# Patient Record
Sex: MALE | Race: WHITE | NOT HISPANIC OR LATINO | Employment: FULL TIME | URBAN - NONMETROPOLITAN AREA
[De-identification: names, ages, dates, MRNs, and addresses within clinical notes are randomized per-mention and may not be internally consistent; named-entity substitution may affect disease eponyms.]

---

## 2024-08-14 ENCOUNTER — OFFICE VISIT (OUTPATIENT)
Dept: URGENT CARE | Facility: CLINIC | Age: 24
End: 2024-08-14

## 2024-08-14 VITALS
DIASTOLIC BLOOD PRESSURE: 74 MMHG | HEART RATE: 74 BPM | OXYGEN SATURATION: 98 % | TEMPERATURE: 98.2 F | SYSTOLIC BLOOD PRESSURE: 110 MMHG | RESPIRATION RATE: 18 BRPM

## 2024-08-14 DIAGNOSIS — J06.9 VIRAL UPPER RESPIRATORY TRACT INFECTION WITH COUGH: Primary | ICD-10-CM

## 2024-08-14 DIAGNOSIS — J02.9 ACUTE VIRAL PHARYNGITIS: ICD-10-CM

## 2024-08-14 LAB
S PYO AG THROAT QL: NEGATIVE
SARS-COV-2 AG UPPER RESP QL IA: NEGATIVE
VALID CONTROL: NORMAL

## 2024-08-14 PROCEDURE — 87880 STREP A ASSAY W/OPTIC: CPT

## 2024-08-14 PROCEDURE — G0382 LEV 3 HOSP TYPE B ED VISIT: HCPCS | Performed by: STUDENT IN AN ORGANIZED HEALTH CARE EDUCATION/TRAINING PROGRAM

## 2024-08-14 PROCEDURE — 87811 SARS-COV-2 COVID19 W/OPTIC: CPT

## 2024-08-14 RX ORDER — PREDNISONE 20 MG/1
40 TABLET ORAL DAILY
Qty: 10 TABLET | Refills: 0 | Status: SHIPPED | OUTPATIENT
Start: 2024-08-14 | End: 2024-08-19

## 2024-08-14 NOTE — PROGRESS NOTES
[facility]        NAME: Anthony Gandhi is a 24 y.o. male  : 2000    MRN: [id number]  DATE: 2024  TIME: 6:02 PM    Assessment and Plan   Acute cough [R05.1]  1. Acute cough  Poct Covid 19 Rapid Antigen Test      2. Sore throat  POCT rapid ANTIGEN strepA        POCT COVID: NEGATIVE in this clinic  Rapid strep: NEG in this clinic    Will treat using symptom management. Pt instructed to seek followup with PCP in the event he is not better in about 5-7 days. Otherwise, if he worsens, he is to seek care sooner or proceed to the ER for further evaluation.     Patient Instructions     If your symptoms do not improve with using over the counter medications you will need to follow up with your family doctor. If your symptoms worsen you will need to go to the emergency room for an evaluation.     You may take over the counter Tylenol (Acetaminophen) and/or Motrin (Ibuprofen) as needed, as directed on packaging.     You may take over the counter cough and cold medications to help alleviate your symptoms.     You may use flonase nasal spray to help decrease the nasal congestion. Use as directed and lay down for 5-10 minutes after using this medicine.     Proceed to  ER if symptoms worsen.    If tests are performed, our office will contact you with results only if changes need to made to the care plan discussed with you at the visit. You can review your full results on [facility].    Chief Complaint     Chief Complaint   Patient presents with    Cold Like Symptoms     Chills, headache, sore throat and fever.  Symptoms since Friday.  Taking Tylenol.  No home testing done.  Also states hands have been intermittently turning purple, pins and needles and are painful.  It does resolve but then returns.         History of Present Illness       24-year-old male patient presents with complaint of chills, headache, sore throat, and fever.  Symptoms have been present since Friday.  Patient is taking  Tylenol over-the-counter.  Patient has not performed home COVID testing.  Also is reporting that his hands have been intermittently turning purple and he is experiencing pins and needle sensations in the hands at times.  Symptoms do resolve but then returned.  There is no shortness of breath or chest pain. Pt is taking plain tylenol OTC as needed for pain and symptoms.         Review of Systems   Review of Systems   Constitutional:  Positive for chills and fever.   HENT:  Positive for sore throat.    Musculoskeletal:  Positive for myalgias.   Neurological:  Positive for numbness and headaches.         Current Medications     No current outpatient medications on file.    Current Allergies     Allergies as of 08/14/2024 - Reviewed 08/14/2024   Allergen Reaction Noted    Aspirin Edema 08/14/2024            The following portions of the patient's history were reviewed and updated as appropriate: allergies, current medications, past family history, past medical history, past social history, past surgical history and problem list.     Past Medical History:   Diagnosis Date    Asthma        Past Surgical History:   Procedure Laterality Date    HEMORROIDECTOMY         History reviewed. No pertinent family history.      Medications have been verified.        Objective   /74   Pulse 74   Temp 98.2 °F (36.8 °C) (Skin)   Resp 18   SpO2 98%        Physical Exam     Physical Exam  Vitals and nursing note reviewed.   Constitutional:       Appearance: Normal appearance. He is normal weight.   HENT:      Head: Normocephalic and atraumatic.      Right Ear: Hearing, tympanic membrane, ear canal and external ear normal. Tympanic membrane is not erythematous or bulging.      Left Ear: Hearing, ear canal and external ear normal. A middle ear effusion is present. Tympanic membrane is injected. Tympanic membrane is not erythematous or bulging.      Nose: Congestion present.      Mouth/Throat:      Lips: Pink. No lesions.       Mouth: Mucous membranes are moist.      Tongue: No lesions. Tongue does not deviate from midline.      Palate: No mass and lesions.      Pharynx: Uvula midline. Pharyngeal swelling, oropharyngeal exudate and posterior oropharyngeal erythema present.      Tonsils: Tonsillar exudate present. No tonsillar abscesses. 1+ on the right. 1+ on the left.   Eyes:      Extraocular Movements: Extraocular movements intact.      Conjunctiva/sclera: Conjunctivae normal.      Pupils: Pupils are equal, round, and reactive to light.   Cardiovascular:      Rate and Rhythm: Normal rate and regular rhythm.      Pulses: Normal pulses.      Heart sounds: Normal heart sounds.   Pulmonary:      Effort: Pulmonary effort is normal.      Breath sounds: Normal breath sounds.   Abdominal:      General: Abdomen is flat. Bowel sounds are normal.      Palpations: Abdomen is soft.   Musculoskeletal:         General: Normal range of motion.      Cervical back: Normal range of motion and neck supple.   Skin:     General: Skin is warm and dry.      Capillary Refill: Capillary refill takes less than 2 seconds.   Neurological:      General: No focal deficit present.      Mental Status: He is alert and oriented to person, place, and time.

## 2024-08-14 NOTE — PATIENT INSTRUCTIONS
If your symptoms do not improve with using over the counter medications you will need to follow up with your family doctor. If your symptoms worsen you will need to go to the emergency room for an evaluation.     You may take over the counter Tylenol (Acetaminophen) and/or Motrin (Ibuprofen) as needed, as directed on packaging.     You may take over the counter cough and cold medications to help alleviate your symptoms.     You may use flonase nasal spray to help decrease the nasal congestion. Use as directed and lay down for 5-10 minutes after using this medicine.